# Patient Record
Sex: MALE | Race: WHITE
[De-identification: names, ages, dates, MRNs, and addresses within clinical notes are randomized per-mention and may not be internally consistent; named-entity substitution may affect disease eponyms.]

---

## 2019-08-23 ENCOUNTER — HOSPITAL ENCOUNTER (OUTPATIENT)
Dept: HOSPITAL 47 - RADCTMAIN | Age: 64
Discharge: HOME | End: 2019-08-23
Attending: OTOLARYNGOLOGY
Payer: MEDICARE

## 2019-08-23 DIAGNOSIS — H69.90: ICD-10-CM

## 2019-08-23 DIAGNOSIS — H91.90: ICD-10-CM

## 2019-08-23 DIAGNOSIS — H92.09: ICD-10-CM

## 2019-08-23 DIAGNOSIS — H70.10: Primary | ICD-10-CM

## 2019-08-23 PROCEDURE — 70480 CT ORBIT/EAR/FOSSA W/O DYE: CPT

## 2021-04-27 ENCOUNTER — HOSPITAL ENCOUNTER (OUTPATIENT)
Dept: HOSPITAL 47 - RADUSWWP | Age: 66
Discharge: HOME | End: 2021-04-27
Attending: CLINIC/CENTER
Payer: OTHER GOVERNMENT

## 2021-04-27 DIAGNOSIS — I77.811: Primary | ICD-10-CM

## 2021-04-27 PROCEDURE — 76700 US EXAM ABDOM COMPLETE: CPT

## 2021-04-27 NOTE — US
EXAMINATION TYPE: US abdomen complete

 

DATE OF EXAM: 4/27/2021

 

COMPARISON: NONE

 

CLINICAL HISTORY: Z87.891 Personal hx nicotine dependence. Smoker x 45 years; screening for AAA

 

EXAM MEASUREMENTS:

 

Liver Length:  13.7 cm   

Gallbladder Wall:  0.2 cm   

CBD:  0.4 cm

Spleen:  10.3 cm   

Right Kidney:  12.7x 6.8 x 6.4 cm 

Left Kidney:  13.2 x 7.5 x 6.1 cm   

 

 

 

Pancreas:  hyperechoic; tail obscured by overlying bowel gas

Liver:  wnl  

Gallbladder:  wnl

**Evidence for sonographic Rocha's sign: no

CBD:  wnl 

Spleen:  wnl   

Right Kidney:  No hydronephrosis or masses seen   

Left Kidney:  multiple renal cysts are imaged with largest at inferior pole = 2.8 x 2.6 x 2.4cm    

Upper IVC:  wnl  

Abd Aorta:  size is wnl; intimal wall thickening is noted mid and distally and into common iliac martin
raphael.

 

 

 

The visualized liver is homogenous.  The intrahepatic portion of the IVC is seen. Some ectasia to abd
ominal aorta without greater than 3.0 cm aneurysm  There is no evidence of cholelithiasis.  Common bi
le duct is unremarkable.  The visualized portions of the pancreas are homogenous.  The spleen is unre
markable.  Kidneys are symmetric and free of hydronephrosis. Cortical thinning right kidney. Few scat
tered small simple appearing thin-walled cysts throughout the left kidney.

 

IMPRESSION: Ectasia to the abdominal aorta without greater than 3.0 cm AAA.

## 2021-04-30 ENCOUNTER — HOSPITAL ENCOUNTER (OUTPATIENT)
Dept: HOSPITAL 47 - RADCTMAIN | Age: 66
Discharge: HOME | End: 2021-04-30
Attending: NURSE PRACTITIONER
Payer: MEDICARE

## 2021-04-30 DIAGNOSIS — Z87.891: ICD-10-CM

## 2021-04-30 DIAGNOSIS — J44.9: ICD-10-CM

## 2021-04-30 DIAGNOSIS — Z12.2: Primary | ICD-10-CM

## 2021-04-30 PROCEDURE — 71271 CT THORAX LUNG CANCER SCR C-: CPT

## 2021-04-30 NOTE — CTL
EXAMINATION TYPE:  CT Low Dose Lung

 

DATE OF EXAM ORDERED: 4/30/2021

 

HISTORY: . Lung cancer screening

 

CT DLP: 147 mGycm

CT CTDI: 3.5 mGy

Automated exposure control for dose reduction was used.

 

SCREENING VISIT:

 

COMPARISON:

 

TECHNIQUE: Low dose computed tomography scan was performed through the chest at 1 mm thick sections a
nd reconstructed images in the coronal plane at 1 mm thick sections.

 

CT DIAGNOSTIC QUALITY: Satisfactory

 

FINDINGS:

 

LUNGS:

Emphysematous changes are seen. No pleural effusion or consolidation. No pneumothorax. No significant
 pleural thickening. No pleural calcifications.

 

Heart mildly prominent with a trace of pericardial fluid. No sizable pulmonary nodule. Mild coronary 
artery calcification. Aorta of normal caliber. Hypertrophic and degenerative change of the spine.

 

Limited imaging of the upper abdomen demonstrates no definite abnormality. 1.6 cm right adrenal nodul
e.

 

 

IMPRESSION: 

1. COPD with no sizable pulmonary nodule.

2. Indeterminate right adrenal nodule measuring 1.6 cm.

 

CT LUNG RAD AND CT CHEST RECOMMENDATION: Lung-Rad 1 Negative: Continue annual screening with LDCT in 
12 months.

## 2022-06-08 ENCOUNTER — HOSPITAL ENCOUNTER (OUTPATIENT)
Dept: HOSPITAL 47 - RADCTMAIN | Age: 67
Discharge: HOME | End: 2022-06-08
Attending: CLINIC/CENTER
Payer: OTHER GOVERNMENT

## 2022-06-08 DIAGNOSIS — R91.1: ICD-10-CM

## 2022-06-08 DIAGNOSIS — D35.01: ICD-10-CM

## 2022-06-08 DIAGNOSIS — Z12.2: Primary | ICD-10-CM

## 2022-06-08 DIAGNOSIS — Z87.891: ICD-10-CM

## 2022-06-08 PROCEDURE — 71271 CT THORAX LUNG CANCER SCR C-: CPT

## 2022-06-08 NOTE — CTL
EXAMINATION TYPE:  CT Low Dose Lung

 

DATE OF EXAM ORDERED: 6/8/2022

 

HISTORY: Tobacco use. Lung cancer screening

 

CT DLP: 98.00 mGycm

CT CTDI: 2.80 mGy

Automated exposure control for dose reduction was used.

 

SCREENING VISIT: Follow-up

 

COMPARISON: CT dated 4/30/2021

 

TECHNIQUE: Low dose computed tomography scan was performed through the chest at 1 mm thick sections a
nd reconstructed images in multiple planes at 1 mm and 5 mm thick sections.

 

CT DIAGNOSTIC QUALITY: Satisfactory

 

FINDINGS:

LUNG NODULES:    

 

Left lung solid  3 mm nodule on CT image 201.  This nodule is unchanged.

 

LUNGS:

COPD: Severity: Mild

Fibrosis: Severity: None

Lymph nodes: No pathologic lymph nodes

Other findings: None

 

RIGHT PLEURAL SPACE:

Effusion: None

Calcification: None

Thickening: None

Pneumothorax: None

 

LEFT PLEURAL SPACE:

Effusion: None

Calcification: None

Thickening: None

Pneumothorax: None

 

HEART:  

Heart Size: Normal

Coronary Calcification: Mild

Pericardial Effusion: None

 

OTHER FINDINGS:

Upper abdomen: Stable 2.2 cm right adrenal adenoma. Left upper pole renal cyst, incompletely included
 in the scan.

Bony thorax: No aggressive bone lesion.

Supraclavicular region: None

Other: Arterial atherosclerotic calcifications

 

IMPRESSION: Stable left lung 3 mm nodule. No new suspicious or progressive lung lesion. Stable right 
adrenal adenoma.

 

CT LUNG RAD AND CT CHEST RECOMMENDATION: Lung-Rad 2 Benign Appearance or Behavior: Continue annual sc
reening with LDCT in 12 months.

 

S Modifier (other clinically significant findings): As above.

## 2023-06-10 ENCOUNTER — HOSPITAL ENCOUNTER (OUTPATIENT)
Dept: HOSPITAL 47 - RADCTMAIN | Age: 68
Discharge: HOME | End: 2023-06-10
Attending: CLINIC/CENTER
Payer: OTHER GOVERNMENT

## 2023-06-10 DIAGNOSIS — J43.2: Primary | ICD-10-CM

## 2023-06-10 DIAGNOSIS — F17.210: ICD-10-CM

## 2023-06-10 PROCEDURE — 71271 CT THORAX LUNG CANCER SCR C-: CPT

## 2023-06-11 NOTE — CTL
EXAMINATION TYPE: CT Low Dose Lung

 

DATE OF EXAM: 6/10/2023 7:31 AM

 

CLINICAL INDICATION:Male, 67 years old with history of Z87.891 personal hx tobacco use; Personal hist
ory of tobacco use , history of tobacco use.

 

COMPARISON: Most recent 6/8/2022 CT chest

 

TECHNIQUE: Multiple axial non-contrast scans were obtained from approximately the lung apices through
 the upper abdomen. Coronal and sagittal reformatted images were obtained. Low dose technique was uti
lized. 

CT DLP: 145.20 mGycm, Automated exposure control for dose reduction was used.

CT Contrast:

Contrast used: None

Oral contrast used: None

 

FINDINGS:

========

Lack of intravenous contrast and low dose technique limits the evaluation of the vascular and soft ti
ssue structures.

 

LUNGS: No evidence of pulmonary fibrosis. No evidence of focal consolidation, pneumothorax or pleural
 effusion. Mild centrilobular and paraseptal emphysema changes are seen throughout the lungs.

 

Nodules: 

No greater than 4 mm pulmonary nodules. Scattered atelectasis changes along the periphery of the lung
s posteriorly.

    RUL: None.

    RML: None.

    RLL: None.

    XNI: None.

    LLL: None.

 

AIRWAY: Patent and unremarkable.

HEART: Size within normal limits.Mild calcifications of the coronary arteries.

MEDIASTINUM: No gross evidence of adenopathy.

VASCULATURE:  No aortic aneurysm. 

MUSCULOSKELETAL: No acute osseous abnormalities

SOFT TISSUES/LYMPH NODES: Unremarkable.

LOWER NECK: No significant findings.

UPPER ABDOMEN: Stable probable right adrenal adenoma measuring up to 2.1 cm.

 

IMPRESSION:

1. No clinically significant pulmonary nodules.

2. Mild emphysema changes.

 

CT LUNG RAD AND CT CHEST RECOMMENDATION: Lung-Rad 1 Negative: Continue annual screening with LDCT in 
12 months.

 

S Modifier (other clinically significant findings): None

 

Recommend smoking cessation (if current smoker), or continuation of smoking cessation (if prior smoke
r). Annual screening for lung cancer with low-dose computed tomography is recommended in adults ages 
55 to 77 years who have a 30 pack-year smoking history and currently smoke or have quit within the pa
st 15 years. Screening should be discontinued once a person has not smoked for 15 years or develops a
 health problem that substantially limits life expectancy or the ability or willingness to have curat
jose lung surgery.

 

Lung rads 2022

[email or web address]

## 2023-11-06 ENCOUNTER — HOSPITAL ENCOUNTER (OUTPATIENT)
Dept: HOSPITAL 47 - ORWHC2ENDO | Age: 68
Discharge: HOME | End: 2023-11-06
Payer: OTHER GOVERNMENT

## 2023-11-06 VITALS — TEMPERATURE: 99.6 F | RESPIRATION RATE: 16 BRPM

## 2023-11-06 VITALS — DIASTOLIC BLOOD PRESSURE: 74 MMHG | SYSTOLIC BLOOD PRESSURE: 132 MMHG | HEART RATE: 75 BPM

## 2023-11-06 VITALS — BODY MASS INDEX: 25.7 KG/M2

## 2023-11-06 DIAGNOSIS — Z86.010: ICD-10-CM

## 2023-11-06 DIAGNOSIS — Z79.82: ICD-10-CM

## 2023-11-06 DIAGNOSIS — Z79.899: ICD-10-CM

## 2023-11-06 DIAGNOSIS — M19.90: ICD-10-CM

## 2023-11-06 DIAGNOSIS — K62.1: ICD-10-CM

## 2023-11-06 DIAGNOSIS — K63.5: ICD-10-CM

## 2023-11-06 DIAGNOSIS — Z98.890: ICD-10-CM

## 2023-11-06 DIAGNOSIS — F17.210: ICD-10-CM

## 2023-11-06 DIAGNOSIS — Z12.11: Primary | ICD-10-CM

## 2023-11-06 PROCEDURE — 88305 TISSUE EXAM BY PATHOLOGIST: CPT

## 2023-11-06 PROCEDURE — 45385 COLONOSCOPY W/LESION REMOVAL: CPT

## 2023-11-06 NOTE — P.GSHP
History of Present Illness


H&P Date: 11/06/23


Chief Complaint: Screening colonoscopy





This a 8-year-old male presents today for screening colonoscopy.  Patient denies

any significant GI complaints





Past Medical History


Past Medical History: Osteoarthritis (OA)


Additional Past Medical History / Comment(s): hx hepatitis (70's), born with 

large prostate, hx colon polyp, states "episode" showed on ct brain-no residual 

effects (pt unsure what this episode was)


History of Any Multi-Drug Resistant Organisms: None Reported


Additional Past Surgical History / Comment(s): fx wrist & later surgically 

repaired , nose repair surgery x2., jaw surgery, (hx broken jaw and teeth later 

removed), feet surgery, hands (joints), fingers


Past Anesthesia/Blood Transfusion Reactions: No Reported Reaction, Motion 

Sickness


Past Psychological History: No Psychological Hx Reported


Smoking Status: Current every day smoker


Past Alcohol Use History: None Reported


Additional Past Alcohol Use History / Comment(s): smokes 12 cigarettes a day, 

started smoking as teen., hx smoking off and on.


Past Drug Use History: None Reported





- Past Family History


  ** Father


Family Medical History: No Reported History





Medications and Allergies


                                Home Medications











 Medication  Instructions  Recorded  Confirmed  Type


 


Aspirin [Adult Low Dose Aspirin EC] 81 mg PO DAILY 11/02/23 11/06/23 History


 


Atorvastatin [Lipitor] 20 mg PO HS 11/02/23 11/06/23 History


 


Multivitamins, Thera [Multivitamin 1 tab PO DAILY 11/02/23 11/06/23 History





(formulary)]    


 


Omega-3/Dha/Epa/Fish Oil [Fish Oil 1 each PO DAILY 11/02/23 11/06/23 History





1,000 mg Softgel]    


 


Terazosin [Hytrin] 5 mg PO QAM 11/02/23 11/06/23 History


 


Terazosin [Hytrin] 10 mg PO HS 11/02/23 11/06/23 History


 


Vit C/E/Zn/Coppr/Lutein/Zeaxan 1 each PO DAILY 11/02/23 11/06/23 History





[Preservision Areds 2 Softgel]    








                                    Allergies











Allergy/AdvReac Type Severity Reaction Status Date / Time


 


No Known Allergies Allergy   Verified 11/06/23 13:03














Surgical - Exam


                                   Vital Signs











Temp Pulse Resp BP Pulse Ox


 


 99.6 F   86   16   126/79   96 


 


 11/06/23 12:55  11/06/23 12:55  11/06/23 12:55  11/06/23 12:55  11/06/23 12:55














- General


well developed, well nourished, no distress





- Eyes


PERRL





- ENT


normal pinna, normal nares





- Neck


no masses





- Respiratory


normal expansion





- Cardiovascular


Rhythm: regular





- Abdomen


Abdomen: soft, non tender





Assessment and Plan


Assessment: 





We will perform screening colonoscopy

## 2023-11-06 NOTE — P.OP
Date of Procedure: 11/06/23


Preoperative Diagnosis: 


Screening colonoscopy


Postoperative Diagnosis: 


Rectal polyp





Severe diverticulosis


Procedure(s) Performed: 


Colonoscopy


Anesthesia: MAC


Surgeon: Abbe Quinonez


Pathology: other (Rectal polyps)


Condition: stable


Disposition: PACU


Description of Procedure: 


Patient's placed on the endoscopy table in the lateral position.  He received IV

sedation.  Digital rectus performed.  There is extensive external hemorrhoids.  

Possible colitis scope was then placed patient anus and passed with colon.  

Scope was passed beyond the left colon secondary to severe diverticulosis.  

Scope withdrawn.  There was significant diverticular changes of the left and 

descending colon.  There were significant diverticular changes;.  Scope was 

brought back the rectum there were several small polyp was removed with the 

snare and forcep.  Scope was withdrawn for patient.





Patient scheduled for a barium enema as an outpatient.  He will follow-up in the

office of his performed.

## 2023-11-07 ENCOUNTER — HOSPITAL ENCOUNTER (OUTPATIENT)
Dept: HOSPITAL 47 - RADFLMAIN | Age: 68
Discharge: HOME | End: 2023-11-07
Payer: MEDICARE

## 2023-11-07 DIAGNOSIS — K57.30: ICD-10-CM

## 2023-11-07 DIAGNOSIS — R15.0: ICD-10-CM

## 2023-11-07 DIAGNOSIS — Z12.11: Primary | ICD-10-CM

## 2023-11-07 PROCEDURE — 74270 X-RAY XM COLON 1CNTRST STD: CPT

## 2023-11-07 NOTE — FL
EXAMINATION TYPE: FL single contrast barium enema

 

DATE OF EXAM: 11/7/2023

 

COMPARISON: NONE

 

HISTORY: 68-year-old male incomplete colonoscopy, R15.0.

 

TECHNIQUE:  A single contrast barium enema study is performed.  A total of 1 minute 38 seconds of flu
oroscopic time was utilized during procedure and 70 images obtained.  Total dose area product (DAP) i
n uGy*m?, mGy*cm? (or similar): 35.

 

FINDINGS:   view of the abdomen shows overall non-obstructive bowel gas pattern. There is scatte
red air throughout the colon extending distally to the rectum.

 

Due to patient's condition and back problems, decision was made to proceed with a single contrast exa
m.

 

There is some redundancy of the ascending colon and sigmoid colon causing some limitation in assessme
nt.

 

Allowing for this limitation, no evidence of any mass or polyp, obstructing or constricting lesion th
roughout the colon.

 

There is colonic diverticulosis particularly at the cecum and sigmoid colon.

 

Appendix was filled and appeared normal. No reflux into the terminal ileum.

 

 

 

IMPRESSION:  Single contrast utilized due to patient's condition and back problems. Redundancy of the
 ascending colon and sigmoid colon causes some limitation. No obvious annular constricting mass is id
entified. There is diverticulosis at the cecum and ascending colon.

## 2024-06-11 ENCOUNTER — HOSPITAL ENCOUNTER (OUTPATIENT)
Dept: HOSPITAL 47 - RADCTMAIN | Age: 69
Discharge: HOME | End: 2024-06-11
Attending: FAMILY MEDICINE
Payer: OTHER GOVERNMENT

## 2024-06-11 DIAGNOSIS — Z12.2: Primary | ICD-10-CM

## 2024-06-11 DIAGNOSIS — Z87.891: ICD-10-CM

## 2024-06-11 DIAGNOSIS — J44.9: ICD-10-CM

## 2024-06-11 DIAGNOSIS — R91.1: ICD-10-CM

## 2024-06-11 PROCEDURE — 71271 CT THORAX LUNG CANCER SCR C-: CPT

## 2024-06-11 NOTE — CTL
EXAMINATION TYPE:  CT Low Dose Lung

 

DATE OF EXAM ORDERED: 6/11/2024

 

HISTORY: Nicotine dependence. Lung cancer screening

 

CT DLP: 96 mGycm

CT CTDI: 2.4 mGy

Automated exposure control for dose reduction was used.

 

SCREENING VISIT: Follow-up

 

COMPARISON: Multiple CT Low Dose Lung screening with most recent 6/10/2023

 

TECHNIQUE: Low dose computed tomography scan was performed through the chest at 1 mm thick sections a
nd reconstructed images in multiple planes at 1 mm and 5 mm thick sections.

 

CT DIAGNOSTIC QUALITY: Satisfactory

 

FINDINGS:

 

Nodules: 

Stable lingular 4 mm pulmonary nodule (series 6, image 41). No new or enlarging pulmonary nodules. Fe
w scattered punctate calcified granulomas.

 

LUNGS:

COPD: Severity: Mild paraseptal and centrilobular changes.

Fibrosis: Severity: None

Lymph nodes: Stable prominent subcarinal lymph node.

Other findings: None

 

RIGHT PLEURAL SPACE:

Effusion: None

Calcification: None

Thickening: None

Pneumothorax: None

 

LEFT PLEURAL SPACE:

Effusion: None

Calcification: None

Thickening: None

Pneumothorax: None

 

HEART:  

Heart Size: Normal

Coronary Calcification: Small

Pericardial Effusion: None

 

OTHER FINDINGS:

Upper abdomen: Stable lipid rich right adrenal adenoma measuring up to 2.3 cm.

Bony thorax: None

Supraclavicular region: None

Other: None

 

IMPRESSION:

1.  Stable 4 mm lingular nodule. No new or enlarging pulmonary nodules.

2.  Mild COPD changes.

 

CT LUNG RAD AND CT CHEST RECOMMENDATION: Lung-Rad 2 Benign Appearance or Behavior: Continue annual sc
reening with LDCT in 12 months.

 

S Modifier (other clinically significant findings): None

## 2024-06-12 ENCOUNTER — HOSPITAL ENCOUNTER (OUTPATIENT)
Dept: HOSPITAL 47 - RADNMMAIN | Age: 69
Discharge: HOME | End: 2024-06-12
Attending: FAMILY MEDICINE
Payer: MEDICARE

## 2024-06-12 DIAGNOSIS — I45.10: Primary | ICD-10-CM

## 2024-06-12 DIAGNOSIS — I10: ICD-10-CM

## 2024-06-12 DIAGNOSIS — R07.89: ICD-10-CM

## 2024-06-12 PROCEDURE — 78452 HT MUSCLE IMAGE SPECT MULT: CPT

## 2024-06-12 PROCEDURE — 93017 CV STRESS TEST TRACING ONLY: CPT

## 2024-06-12 NOTE — CA
Lexiscan Nuclear Stress Test Report 

 

 Name:    Checo Singer 

 

 MRN:    [id number] 

 

 

 

 Exam Date: 2024 10:17 

 

 Exam Location:      [place]  

                     Stress 

 

 Ht (in):     72     Wt (lb):     195    BSA:    2.11 

 

 Ordering Phys:       Roxy Hughes DO 

 

 Referring Phys:      Roxy Hughes DO 

 

 Technologist:        TING 

 

 Age:    68    Gender:    M 

 

 :    1955 

 Procedure CPT: 

 

 Indications:              R07.89 CHEST PAIN I10 HTN 

 

 ICD-10 Codes: 

 

 Patient History:          Hyperlipidemia and hypertension 

 

 Medications: 

 

 Meds past 24 hrs: 

 

 Pretest Chest Pain: 

 

 STRESS TEST      Lexiscan 

 

 Protocol 

 

 

 

 

 Exercise Duration (min:sec):         01:01 

 Max ST Depressions (mm): 

 Angina Score: 

 Valadez Score: 

 Resting HR (bpm):      56 

 

 Peak HR (bpm):         89 

 

 Resting BP (mmHg):       144    /   82 

 

 Peak BP (mmHg):       145   /   72 

 

 MPHR:    154     Target HR:      131 

 

 % MPHR:     58 

 METS:  1.0 

 

 Total Dose: 

 Peak Dose: 

 Atropine: 

 Double Product:       09051 

 

 BP Response: 

 

 Stress Termination:       INFUSION COMPLETE 

 

 Stress Symptoms: 

 NO SYMPTOMS 

 

 Stress Summary: 

 

 

  ECG ANALYSIS 

 

 Resting ECG: 

 

 Stress ECG: 

 

 CONCLUSIONS 

 No EKG changes of ischemia during Lexiscan infusion 

 Underlying right bundle branch block pattern on twelve-lead EKG 

 Nuclear portion will be reported separately 

 

 Dr. Ruddy Rock MD 

 (Electronically Signed) 

 Final Date:      2024 10:51

## 2024-06-12 NOTE — NM
EXAMINATION TYPE: NM stress lexiscan cardiolite

 

DATE OF EXAM: 6/12/2024

 

COMPARISON: NONE

 

CLINICAL INDICATION: Male, 68 years old with history of R07.89 CHEST PAIN I10 HTN;

 

TECHNIQUE:  After the intravenous administration of 9.7 mCi Tc 99m Sestamibi - Cardiolite resting SPE
CT images acquired 75 minutes post injection. 

 

The patient received 0.4mg Lexiscan, 26 mCi Tc 99m Sestamibi - Stress images obtained 35 minutes post
 injection 

 

FINDINGS:  

 

Review of stress and rest SPECT images demonstrates no distinct perfusion abnormality.  Gated analysi
s shows normal wall motion with an estimated left ventricular ejection fraction of 71 %.

 

 

 

IMPRESSION:  

 

No scintigraphic evidence for reversible ischemia.

## 2025-06-16 ENCOUNTER — HOSPITAL ENCOUNTER (OUTPATIENT)
Dept: HOSPITAL 47 - RADCTMAIN | Age: 70
Discharge: HOME | End: 2025-06-16
Attending: INTERNAL MEDICINE
Payer: OTHER GOVERNMENT

## 2025-06-16 DIAGNOSIS — J43.2: ICD-10-CM

## 2025-06-16 DIAGNOSIS — Z87.891: ICD-10-CM

## 2025-06-16 DIAGNOSIS — Z12.2: Primary | ICD-10-CM

## 2025-06-16 PROCEDURE — 71271 CT THORAX LUNG CANCER SCR C-: CPT
